# Patient Record
Sex: FEMALE | Race: WHITE | Employment: UNEMPLOYED | URBAN - METROPOLITAN AREA
[De-identification: names, ages, dates, MRNs, and addresses within clinical notes are randomized per-mention and may not be internally consistent; named-entity substitution may affect disease eponyms.]

---

## 2021-06-28 LAB
ANTIBODY SCREEN, EXTERNAL: NEGATIVE
HBSAG, EXTERNAL: NEGATIVE
HIV, EXTERNAL: NORMAL
RPR, EXTERNAL: NORMAL
RUBELLA, EXTERNAL: NORMAL
TYPE, ABO & RH, EXTERNAL: NORMAL

## 2021-07-20 LAB
CHLAMYDIA, EXTERNAL: POSITIVE
N. GONORRHEA, EXTERNAL: NEGATIVE

## 2021-08-30 LAB — CHLAMYDIA, EXTERNAL: NEGATIVE

## 2021-12-09 LAB — GRBS, EXTERNAL: NEGATIVE

## 2022-01-01 ENCOUNTER — HOSPITAL ENCOUNTER (INPATIENT)
Age: 22
LOS: 3 days | Discharge: HOME | End: 2022-01-04
Attending: OBSTETRICS & GYNECOLOGY | Admitting: OBSTETRICS & GYNECOLOGY
Payer: COMMERCIAL

## 2022-01-01 ENCOUNTER — ANESTHESIA EVENT (OUTPATIENT)
Dept: LABOR AND DELIVERY | Age: 22
End: 2022-01-01
Payer: COMMERCIAL

## 2022-01-01 ENCOUNTER — ANESTHESIA (OUTPATIENT)
Dept: LABOR AND DELIVERY | Age: 22
End: 2022-01-01
Payer: COMMERCIAL

## 2022-01-01 DIAGNOSIS — Z98.891 S/P CESAREAN SECTION: Primary | ICD-10-CM

## 2022-01-01 LAB
A1 MICROGLOB PLACENTAL VAG QL: POSITIVE
ABO + RH BLD: NORMAL
AMPHET UR QL SCN: NEGATIVE
BARBITURATES UR QL SCN: NEGATIVE
BASOPHILS # BLD: 0 K/UL (ref 0–0.1)
BASOPHILS NFR BLD: 0 % (ref 0–1)
BENZODIAZ UR QL: NEGATIVE
BLOOD GROUP ANTIBODIES SERPL: NORMAL
CANNABINOIDS UR QL SCN: NEGATIVE
COCAINE UR QL SCN: NEGATIVE
CONTROL LINE PRESENT?: ABNORMAL
DIFFERENTIAL METHOD BLD: ABNORMAL
DRUG SCRN COMMENT,DRGCM: NORMAL
EOSINOPHIL # BLD: 0.1 K/UL (ref 0–0.4)
EOSINOPHIL NFR BLD: 1 % (ref 0–7)
ERYTHROCYTE [DISTWIDTH] IN BLOOD BY AUTOMATED COUNT: 12.8 % (ref 11.5–14.5)
EXPIRATION DATE: ABNORMAL
HCT VFR BLD AUTO: 33.8 % (ref 35–47)
HGB BLD-MCNC: 11.1 G/DL (ref 11.5–16)
IMM GRANULOCYTES # BLD AUTO: 0.1 K/UL (ref 0–0.04)
IMM GRANULOCYTES NFR BLD AUTO: 1 % (ref 0–0.5)
INTERNAL NEGATIVE CONTROL: ABNORMAL
KIT LOT NO.: ABNORMAL
LYMPHOCYTES # BLD: 2 K/UL (ref 0.8–3.5)
LYMPHOCYTES NFR BLD: 15 % (ref 12–49)
MCH RBC QN AUTO: 27.7 PG (ref 26–34)
MCHC RBC AUTO-ENTMCNC: 32.8 G/DL (ref 30–36.5)
MCV RBC AUTO: 84.3 FL (ref 80–99)
METHADONE UR QL: NEGATIVE
MONOCYTES # BLD: 1 K/UL (ref 0–1)
MONOCYTES NFR BLD: 8 % (ref 5–13)
NEUTS SEG # BLD: 10.5 K/UL (ref 1.8–8)
NEUTS SEG NFR BLD: 75 % (ref 32–75)
NRBC # BLD: 0 K/UL (ref 0–0.01)
NRBC BLD-RTO: 0 PER 100 WBC
OPIATES UR QL: NEGATIVE
PCP UR QL: NEGATIVE
PLATELET # BLD AUTO: 255 K/UL (ref 150–400)
PMV BLD AUTO: 11.4 FL (ref 8.9–12.9)
RBC # BLD AUTO: 4.01 M/UL (ref 3.8–5.2)
SPECIMEN EXP DATE BLD: NORMAL
WBC # BLD AUTO: 13.8 K/UL (ref 3.6–11)

## 2022-01-01 PROCEDURE — 10H07YZ INSERTION OF OTHER DEVICE INTO PRODUCTS OF CONCEPTION, VIA NATURAL OR ARTIFICIAL OPENING: ICD-10-PCS | Performed by: OBSTETRICS & GYNECOLOGY

## 2022-01-01 PROCEDURE — 74011250636 HC RX REV CODE- 250/636: Performed by: OBSTETRICS & GYNECOLOGY

## 2022-01-01 PROCEDURE — 99284 EMERGENCY DEPT VISIT MOD MDM: CPT

## 2022-01-01 PROCEDURE — 76010000391 HC C SECN FIRST 1 HR: Performed by: OBSTETRICS & GYNECOLOGY

## 2022-01-01 PROCEDURE — 74011000250 HC RX REV CODE- 250: Performed by: ANESTHESIOLOGY

## 2022-01-01 PROCEDURE — 3E0R3BZ INTRODUCTION OF ANESTHETIC AGENT INTO SPINAL CANAL, PERCUTANEOUS APPROACH: ICD-10-PCS | Performed by: ANESTHESIOLOGY

## 2022-01-01 PROCEDURE — 74011000250 HC RX REV CODE- 250

## 2022-01-01 PROCEDURE — 74011000250 HC RX REV CODE- 250: Performed by: STUDENT IN AN ORGANIZED HEALTH CARE EDUCATION/TRAINING PROGRAM

## 2022-01-01 PROCEDURE — 76060000032 HC ANESTHESIA 0.5 TO 1 HR: Performed by: OBSTETRICS & GYNECOLOGY

## 2022-01-01 PROCEDURE — 75410000002 HC LABOR FEE PER 1 HR

## 2022-01-01 PROCEDURE — 75410000003 HC RECOV DEL/VAG/CSECN EA 0.5 HR

## 2022-01-01 PROCEDURE — 36415 COLL VENOUS BLD VENIPUNCTURE: CPT

## 2022-01-01 PROCEDURE — 00HU33Z INSERTION OF INFUSION DEVICE INTO SPINAL CANAL, PERCUTANEOUS APPROACH: ICD-10-PCS | Performed by: ANESTHESIOLOGY

## 2022-01-01 PROCEDURE — 10H073Z INSERTION OF MONITORING ELECTRODE INTO PRODUCTS OF CONCEPTION, VIA NATURAL OR ARTIFICIAL OPENING: ICD-10-PCS | Performed by: OBSTETRICS & GYNECOLOGY

## 2022-01-01 PROCEDURE — 77010026065 HC OXYGEN MINIMUM MEDICAL AIR

## 2022-01-01 PROCEDURE — 74011250636 HC RX REV CODE- 250/636: Performed by: STUDENT IN AN ORGANIZED HEALTH CARE EDUCATION/TRAINING PROGRAM

## 2022-01-01 PROCEDURE — 86900 BLOOD TYPING SEROLOGIC ABO: CPT

## 2022-01-01 PROCEDURE — 84112 EVAL AMNIOTIC FLUID PROTEIN: CPT | Performed by: OBSTETRICS & GYNECOLOGY

## 2022-01-01 PROCEDURE — 76815 OB US LIMITED FETUS(S): CPT

## 2022-01-01 PROCEDURE — 99282 EMERGENCY DEPT VISIT SF MDM: CPT

## 2022-01-01 PROCEDURE — 76060000078 HC EPIDURAL ANESTHESIA: Performed by: OBSTETRICS & GYNECOLOGY

## 2022-01-01 PROCEDURE — 65410000002 HC RM PRIVATE OB

## 2022-01-01 PROCEDURE — 77030014125 HC TY EPDRL BBMI -B: Performed by: ANESTHESIOLOGY

## 2022-01-01 PROCEDURE — 80307 DRUG TEST PRSMV CHEM ANLYZR: CPT

## 2022-01-01 PROCEDURE — 75410000000 HC DELIVERY VAGINAL/SINGLE

## 2022-01-01 PROCEDURE — 85025 COMPLETE CBC W/AUTO DIFF WBC: CPT

## 2022-01-01 PROCEDURE — 74011000250 HC RX REV CODE- 250: Performed by: OBSTETRICS & GYNECOLOGY

## 2022-01-01 RX ORDER — ONDANSETRON 2 MG/ML
4 INJECTION INTRAMUSCULAR; INTRAVENOUS
Status: DISCONTINUED | OUTPATIENT
Start: 2022-01-01 | End: 2022-01-04 | Stop reason: HOSPADM

## 2022-01-01 RX ORDER — ONDANSETRON 2 MG/ML
INJECTION INTRAMUSCULAR; INTRAVENOUS
Status: COMPLETED
Start: 2022-01-01 | End: 2022-01-01

## 2022-01-01 RX ORDER — OXYTOCIN/RINGER'S LACTATE 30/500 ML
PLASTIC BAG, INJECTION (ML) INTRAVENOUS
Status: DISPENSED
Start: 2022-01-01 | End: 2022-01-01

## 2022-01-01 RX ORDER — SODIUM CHLORIDE 0.9 % (FLUSH) 0.9 %
5-40 SYRINGE (ML) INJECTION EVERY 8 HOURS
Status: DISCONTINUED | OUTPATIENT
Start: 2022-01-01 | End: 2022-01-01 | Stop reason: HOSPADM

## 2022-01-01 RX ORDER — MORPHINE SULFATE 0.5 MG/ML
INJECTION, SOLUTION EPIDURAL; INTRATHECAL; INTRAVENOUS AS NEEDED
Status: DISCONTINUED | OUTPATIENT
Start: 2022-01-01 | End: 2022-01-01 | Stop reason: HOSPADM

## 2022-01-01 RX ORDER — EPHEDRINE SULFATE/0.9% NACL/PF 50 MG/5 ML
25 SYRINGE (ML) INTRAVENOUS ONCE
Status: COMPLETED | OUTPATIENT
Start: 2022-01-01 | End: 2022-01-01

## 2022-01-01 RX ORDER — SODIUM CHLORIDE, SODIUM LACTATE, POTASSIUM CHLORIDE, CALCIUM CHLORIDE 600; 310; 30; 20 MG/100ML; MG/100ML; MG/100ML; MG/100ML
125 INJECTION, SOLUTION INTRAVENOUS CONTINUOUS
Status: DISCONTINUED | OUTPATIENT
Start: 2022-01-01 | End: 2022-01-01

## 2022-01-01 RX ORDER — OXYTOCIN/RINGER'S LACTATE 30/500 ML
10 PLASTIC BAG, INJECTION (ML) INTRAVENOUS AS NEEDED
Status: COMPLETED | OUTPATIENT
Start: 2022-01-01 | End: 2022-01-01

## 2022-01-01 RX ORDER — OXYTOCIN/RINGER'S LACTATE 30/500 ML
1-25 PLASTIC BAG, INJECTION (ML) INTRAVENOUS
Status: DISCONTINUED | OUTPATIENT
Start: 2022-01-01 | End: 2022-01-02 | Stop reason: ALTCHOICE

## 2022-01-01 RX ORDER — OXYTOCIN/RINGER'S LACTATE 30/500 ML
PLASTIC BAG, INJECTION (ML) INTRAVENOUS
Status: DISCONTINUED
Start: 2022-01-01 | End: 2022-01-01

## 2022-01-01 RX ORDER — LIDOCAINE HYDROCHLORIDE AND EPINEPHRINE 20; 5 MG/ML; UG/ML
INJECTION, SOLUTION EPIDURAL; INFILTRATION; INTRACAUDAL; PERINEURAL AS NEEDED
Status: DISCONTINUED | OUTPATIENT
Start: 2022-01-01 | End: 2022-01-01 | Stop reason: HOSPADM

## 2022-01-01 RX ORDER — BUTORPHANOL TARTRATE 2 MG/ML
1 INJECTION INTRAMUSCULAR; INTRAVENOUS
Status: DISCONTINUED | OUTPATIENT
Start: 2022-01-01 | End: 2022-01-04 | Stop reason: HOSPADM

## 2022-01-01 RX ORDER — EPHEDRINE SULFATE/0.9% NACL/PF 50 MG/5 ML
12.5 SYRINGE (ML) INTRAVENOUS
Status: COMPLETED | OUTPATIENT
Start: 2022-01-01 | End: 2022-01-01

## 2022-01-01 RX ORDER — SODIUM CHLORIDE, SODIUM LACTATE, POTASSIUM CHLORIDE, CALCIUM CHLORIDE 600; 310; 30; 20 MG/100ML; MG/100ML; MG/100ML; MG/100ML
INJECTION, SOLUTION INTRAVENOUS
Status: DISCONTINUED | OUTPATIENT
Start: 2022-01-01 | End: 2022-01-01 | Stop reason: HOSPADM

## 2022-01-01 RX ORDER — DIPHENHYDRAMINE HCL 25 MG
25 CAPSULE ORAL
Status: DISCONTINUED | OUTPATIENT
Start: 2022-01-01 | End: 2022-01-04 | Stop reason: HOSPADM

## 2022-01-01 RX ORDER — WATER FOR INJECTION,STERILE
VIAL (ML) INJECTION
Status: DISPENSED
Start: 2022-01-01 | End: 2022-01-02

## 2022-01-01 RX ORDER — EPHEDRINE SULFATE/0.9% NACL/PF 50 MG/5 ML
SYRINGE (ML) INTRAVENOUS
Status: DISPENSED
Start: 2022-01-01 | End: 2022-01-02

## 2022-01-01 RX ORDER — OXYCODONE AND ACETAMINOPHEN 5; 325 MG/1; MG/1
2 TABLET ORAL
Status: DISCONTINUED | OUTPATIENT
Start: 2022-01-01 | End: 2022-01-04 | Stop reason: HOSPADM

## 2022-01-01 RX ORDER — SODIUM CHLORIDE 0.9 % (FLUSH) 0.9 %
5-40 SYRINGE (ML) INJECTION AS NEEDED
Status: DISCONTINUED | OUTPATIENT
Start: 2022-01-01 | End: 2022-01-04 | Stop reason: HOSPADM

## 2022-01-01 RX ORDER — ACETAMINOPHEN 325 MG/1
650 TABLET ORAL
Status: DISCONTINUED | OUTPATIENT
Start: 2022-01-01 | End: 2022-01-04 | Stop reason: HOSPADM

## 2022-01-01 RX ORDER — SIMETHICONE 80 MG
80 TABLET,CHEWABLE ORAL AS NEEDED
Status: DISCONTINUED | OUTPATIENT
Start: 2022-01-01 | End: 2022-01-04 | Stop reason: HOSPADM

## 2022-01-01 RX ORDER — FENTANYL/BUPIVACAINE/NS/PF 2-1250MCG
PREFILLED PUMP RESERVOIR EPIDURAL
Status: COMPLETED
Start: 2022-01-01 | End: 2022-01-01

## 2022-01-01 RX ORDER — SODIUM CHLORIDE 0.9 % (FLUSH) 0.9 %
5-40 SYRINGE (ML) INJECTION EVERY 8 HOURS
Status: DISCONTINUED | OUTPATIENT
Start: 2022-01-01 | End: 2022-01-04 | Stop reason: HOSPADM

## 2022-01-01 RX ORDER — PHENYLEPHRINE HYDROCHLORIDE 10 MG/ML
INJECTION INTRAVENOUS
Status: DISCONTINUED | OUTPATIENT
Start: 2022-01-01 | End: 2022-01-01 | Stop reason: HOSPADM

## 2022-01-01 RX ORDER — SODIUM CHLORIDE 0.9 % (FLUSH) 0.9 %
5-40 SYRINGE (ML) INJECTION AS NEEDED
Status: DISCONTINUED | OUTPATIENT
Start: 2022-01-01 | End: 2022-01-01 | Stop reason: HOSPADM

## 2022-01-01 RX ORDER — IBUPROFEN 400 MG/1
800 TABLET ORAL
Status: DISCONTINUED | OUTPATIENT
Start: 2022-01-01 | End: 2022-01-04 | Stop reason: HOSPADM

## 2022-01-01 RX ORDER — OXYTOCIN 10 [USP'U]/ML
INJECTION, SOLUTION INTRAMUSCULAR; INTRAVENOUS AS NEEDED
Status: DISCONTINUED | OUTPATIENT
Start: 2022-01-01 | End: 2022-01-01 | Stop reason: HOSPADM

## 2022-01-01 RX ORDER — BUTORPHANOL TARTRATE 2 MG/ML
INJECTION INTRAMUSCULAR; INTRAVENOUS
Status: DISPENSED
Start: 2022-01-01 | End: 2022-01-01

## 2022-01-01 RX ORDER — FENTANYL/BUPIVACAINE/NS/PF 2-1250MCG
12 PREFILLED PUMP RESERVOIR EPIDURAL CONTINUOUS
Status: DISCONTINUED | OUTPATIENT
Start: 2022-01-01 | End: 2022-01-01 | Stop reason: HOSPADM

## 2022-01-01 RX ORDER — MAGNESIUM CITRATE
SOLUTION, ORAL ORAL
Status: DISPENSED
Start: 2022-01-01 | End: 2022-01-02

## 2022-01-01 RX ORDER — OXYTOCIN/RINGER'S LACTATE 30/500 ML
87.3 PLASTIC BAG, INJECTION (ML) INTRAVENOUS AS NEEDED
Status: DISCONTINUED | OUTPATIENT
Start: 2022-01-01 | End: 2022-01-04 | Stop reason: HOSPADM

## 2022-01-01 RX ORDER — BUPIVACAINE HYDROCHLORIDE 2.5 MG/ML
INJECTION, SOLUTION EPIDURAL; INFILTRATION; INTRACAUDAL
Status: COMPLETED
Start: 2022-01-01 | End: 2022-01-01

## 2022-01-01 RX ORDER — BUPIVACAINE HYDROCHLORIDE 2.5 MG/ML
INJECTION, SOLUTION EPIDURAL; INFILTRATION; INTRACAUDAL
Status: COMPLETED | OUTPATIENT
Start: 2022-01-01 | End: 2022-01-01

## 2022-01-01 RX ORDER — ONDANSETRON 2 MG/ML
INJECTION INTRAMUSCULAR; INTRAVENOUS AS NEEDED
Status: DISCONTINUED | OUTPATIENT
Start: 2022-01-01 | End: 2022-01-01 | Stop reason: HOSPADM

## 2022-01-01 RX ORDER — NALOXONE HYDROCHLORIDE 0.4 MG/ML
0.4 INJECTION, SOLUTION INTRAMUSCULAR; INTRAVENOUS; SUBCUTANEOUS AS NEEDED
Status: DISCONTINUED | OUTPATIENT
Start: 2022-01-01 | End: 2022-01-04 | Stop reason: HOSPADM

## 2022-01-01 RX ORDER — CEFAZOLIN SODIUM 1 G/3ML
INJECTION, POWDER, FOR SOLUTION INTRAMUSCULAR; INTRAVENOUS
Status: DISPENSED
Start: 2022-01-01 | End: 2022-01-02

## 2022-01-01 RX ORDER — DOCUSATE SODIUM 100 MG/1
100 CAPSULE, LIQUID FILLED ORAL 2 TIMES DAILY
Status: DISCONTINUED | OUTPATIENT
Start: 2022-01-01 | End: 2022-01-04 | Stop reason: HOSPADM

## 2022-01-01 RX ORDER — FENTANYL CITRATE 50 UG/ML
INJECTION, SOLUTION INTRAMUSCULAR; INTRAVENOUS AS NEEDED
Status: DISCONTINUED | OUTPATIENT
Start: 2022-01-01 | End: 2022-01-01 | Stop reason: HOSPADM

## 2022-01-01 RX ORDER — ONDANSETRON 2 MG/ML
4 INJECTION INTRAMUSCULAR; INTRAVENOUS
Status: DISCONTINUED | OUTPATIENT
Start: 2022-01-01 | End: 2022-01-02 | Stop reason: ALTCHOICE

## 2022-01-01 RX ORDER — AZITHROMYCIN 100 MG/ML
INJECTION, POWDER, LYOPHILIZED, FOR SOLUTION INTRAVENOUS
Status: DISPENSED
Start: 2022-01-01 | End: 2022-01-02

## 2022-01-01 RX ORDER — SODIUM CHLORIDE, SODIUM LACTATE, POTASSIUM CHLORIDE, CALCIUM CHLORIDE 600; 310; 30; 20 MG/100ML; MG/100ML; MG/100ML; MG/100ML
125 INJECTION, SOLUTION INTRAVENOUS CONTINUOUS
Status: DISCONTINUED | OUTPATIENT
Start: 2022-01-01 | End: 2022-01-04 | Stop reason: HOSPADM

## 2022-01-01 RX ADMIN — SODIUM CHLORIDE, POTASSIUM CHLORIDE, SODIUM LACTATE AND CALCIUM CHLORIDE 125 ML/HR: 600; 310; 30; 20 INJECTION, SOLUTION INTRAVENOUS at 20:12

## 2022-01-01 RX ADMIN — SODIUM CHLORIDE, POTASSIUM CHLORIDE, SODIUM LACTATE AND CALCIUM CHLORIDE 125 ML/HR: 600; 310; 30; 20 INJECTION, SOLUTION INTRAVENOUS at 13:43

## 2022-01-01 RX ADMIN — BUPIVACAINE HYDROCHLORIDE 20 ML: 2.5 INJECTION, SOLUTION EPIDURAL; INFILTRATION; INTRACAUDAL; PERINEURAL at 06:20

## 2022-01-01 RX ADMIN — ONDANSETRON 4 MG: 2 INJECTION INTRAMUSCULAR; INTRAVENOUS at 05:41

## 2022-01-01 RX ADMIN — Medication 12 ML/HR: at 06:41

## 2022-01-01 RX ADMIN — OXYTOCIN 10000 MILLI-UNITS: 10 INJECTION INTRAVENOUS at 20:14

## 2022-01-01 RX ADMIN — OXYTOCIN 3 UNITS: 10 INJECTION, SOLUTION INTRAMUSCULAR; INTRAVENOUS at 19:33

## 2022-01-01 RX ADMIN — Medication 12 ML/HR: at 14:28

## 2022-01-01 RX ADMIN — BUTORPHANOL TARTRATE 1 MG: 2 INJECTION, SOLUTION INTRAMUSCULAR; INTRAVENOUS at 05:40

## 2022-01-01 RX ADMIN — SODIUM CHLORIDE, POTASSIUM CHLORIDE, SODIUM LACTATE AND CALCIUM CHLORIDE 125 ML/HR: 600; 310; 30; 20 INJECTION, SOLUTION INTRAVENOUS at 17:18

## 2022-01-01 RX ADMIN — SODIUM CHLORIDE, POTASSIUM CHLORIDE, SODIUM LACTATE AND CALCIUM CHLORIDE 999 ML/HR: 600; 310; 30; 20 INJECTION, SOLUTION INTRAVENOUS at 05:00

## 2022-01-01 RX ADMIN — SODIUM CHLORIDE, POTASSIUM CHLORIDE, SODIUM LACTATE AND CALCIUM CHLORIDE: 600; 310; 30; 20 INJECTION, SOLUTION INTRAVENOUS at 19:38

## 2022-01-01 RX ADMIN — PHENYLEPHRINE HYDROCHLORIDE 120 MCG: 10 INJECTION INTRAVENOUS at 19:38

## 2022-01-01 RX ADMIN — AZITHROMYCIN MONOHYDRATE 500 MG: 500 INJECTION, POWDER, LYOPHILIZED, FOR SOLUTION INTRAVENOUS at 19:25

## 2022-01-01 RX ADMIN — PHENYLEPHRINE HYDROCHLORIDE 25 MCG/KG/MIN: 10 INJECTION INTRAVENOUS at 19:21

## 2022-01-01 RX ADMIN — Medication 2.5 MG: at 19:34

## 2022-01-01 RX ADMIN — Medication 12.5 MG: at 12:18

## 2022-01-01 RX ADMIN — Medication 25 MG: at 14:24

## 2022-01-01 RX ADMIN — Medication 1 MILLI-UNITS/MIN: at 10:03

## 2022-01-01 RX ADMIN — FENTANYL CITRATE 100 MCG: 50 INJECTION, SOLUTION INTRAMUSCULAR; INTRAVENOUS at 19:28

## 2022-01-01 RX ADMIN — SODIUM CHLORIDE, POTASSIUM CHLORIDE, SODIUM LACTATE AND CALCIUM CHLORIDE 125 ML/HR: 600; 310; 30; 20 INJECTION, SOLUTION INTRAVENOUS at 06:20

## 2022-01-01 RX ADMIN — LIDOCAINE HYDROCHLORIDE,EPINEPHRINE BITARTRATE 4 ML: 20; .005 INJECTION, SOLUTION EPIDURAL; INFILTRATION; INTRACAUDAL; PERINEURAL at 19:24

## 2022-01-01 RX ADMIN — SODIUM CHLORIDE, POTASSIUM CHLORIDE, SODIUM LACTATE AND CALCIUM CHLORIDE: 600; 310; 30; 20 INJECTION, SOLUTION INTRAVENOUS at 19:20

## 2022-01-01 RX ADMIN — CEFAZOLIN SODIUM 2 G: 1 INJECTION, POWDER, FOR SOLUTION INTRAMUSCULAR; INTRAVENOUS at 18:59

## 2022-01-01 RX ADMIN — ONDANSETRON HYDROCHLORIDE 4 MG: 2 INJECTION, SOLUTION INTRAMUSCULAR; INTRAVENOUS at 19:29

## 2022-01-01 RX ADMIN — OXYTOCIN 2 UNITS: 10 INJECTION, SOLUTION INTRAMUSCULAR; INTRAVENOUS at 19:48

## 2022-01-01 RX ADMIN — LIDOCAINE HYDROCHLORIDE,EPINEPHRINE BITARTRATE 8 ML: 20; .005 INJECTION, SOLUTION EPIDURAL; INFILTRATION; INTRACAUDAL; PERINEURAL at 18:58

## 2022-01-01 NOTE — H&P
History & Physical    Name: Viry Bah MRN: [id number]  SSN: [id number]    YOB: 2000  Age: 22 y.o. Sex: female        Subjective:   CC: Contractions and ROM  Estimated Date of Delivery: 22  OB History    Para Term  AB Living   1             SAB IAB Ectopic Molar Multiple Live Births                    # Outcome Date GA Lbr Dionisio/2nd Weight Sex Delivery Anes PTL Lv   1 Current                Ms. Taty Chandler is admitted with pregnancy at 39w6d for active labor. Pt presents c/o regular contractions and ROM at 0245 hrs. Prenatal course was complicated by anxiety/Dep/Bipolar for which the pt takes 50 mg Seroquel qhs. Cx was closed at last office visit. Valerie Mitchell Please see prenatal records for details. GBS is negative. Past Medical History:   Diagnosis Date    Asthma     last attack is in few years. Exercise induced    Chlamydia     with this preg. and treated    Psychiatric problem     Bipolar - on meds     Past Surgical History:   Procedure Laterality Date    HX OTHER SURGICAL      Oral surgery     Social History     Occupational History    Not on file   Tobacco Use    Smoking status: Former Smoker     Packs/day: 0.50     Years: 2.00     Pack years: 1.00     Quit date: 2021     Years since quittin.0    Smokeless tobacco: Never Used   Substance and Sexual Activity    Alcohol use: Never    Drug use: Not Currently     Types: Marijuana    Sexual activity: Not on file     Family History   Problem Relation Age of Onset    Bleeding Prob Mother     Heart Disease Mother     Asthma Father     Psychiatric Disorder Sister     Cancer Maternal Grandmother     Diabetes Maternal Grandmother     Cancer Maternal Grandfather     Cancer Paternal Grandmother      Allergies   Allergen Reactions    Pcn [Penicillins] Other (comments)     Prior to Admission medications    Medication Sig Start Date End Date Taking?  Authorizing Provider   PNV Comb #2-Iron-FA-Omega 3 29-1-400 mg cmpk Take 1 Tablet by mouth. Indications: pregnancy   Yes Provider, Historical        Review of Systems   Constitutional: Negative for chills and fever. HENT: Negative for mouth sores and sore throat. Eyes: Negative for visual disturbance. Respiratory: Negative for cough, shortness of breath and wheezing. Cardiovascular: Negative for chest pain, palpitations and leg swelling. Gastrointestinal: Negative for constipation, diarrhea, nausea and vomiting. Endocrine: Negative for cold intolerance and heat intolerance. Genitourinary: Positive for vaginal discharge. Negative for dysuria, frequency, genital sores, hematuria and vaginal bleeding. Musculoskeletal: Negative for arthralgias and myalgias. Skin: Negative for rash. Neurological: Negative for dizziness and headaches. Hematological: Negative for adenopathy. Psychiatric/Behavioral: Negative for agitation, behavioral problems, confusion and decreased concentration. Objective:     Vitals:  Vitals:    01/01/22 0436 01/01/22 0439 01/01/22 0442 01/01/22 0444   BP:       Pulse:       Resp:       Temp:       SpO2: 93% 96% 93% 97%   Weight:       Height:            Physical Exam  Vitals and nursing note reviewed. Exam conducted with a chaperone present. Constitutional:       General: She is not in acute distress. Appearance: Normal appearance. She is obese. She is not ill-appearing, toxic-appearing or diaphoretic. HENT:      Head: Normocephalic and atraumatic. Right Ear: External ear normal.      Left Ear: External ear normal.   Eyes:      General: No scleral icterus. Right eye: No discharge. Left eye: No discharge. Extraocular Movements: Extraocular movements intact. Cardiovascular:      Rate and Rhythm: Normal rate and regular rhythm. Heart sounds: Normal heart sounds. No murmur heard. No friction rub. No gallop. Pulmonary:      Effort: Pulmonary effort is normal. No respiratory distress.       Breath sounds: Normal breath sounds. No stridor. No wheezing, rhonchi or rales. Abdominal:      General: Bowel sounds are normal. There is no distension. Palpations: Abdomen is soft. There is no mass. Tenderness: There is no abdominal tenderness. There is no right CVA tenderness, left CVA tenderness, guarding or rebound. Hernia: No hernia is present. Genitourinary:     General: Normal vulva. Musculoskeletal:         General: No swelling, tenderness, deformity or signs of injury. Normal range of motion. Cervical back: Normal range of motion and neck supple. No rigidity or tenderness. Right lower leg: No edema. Left lower leg: No edema. Lymphadenopathy:      Cervical: No cervical adenopathy. Skin:     General: Skin is warm and dry. Capillary Refill: Capillary refill takes less than 2 seconds. Coloration: Skin is not jaundiced or pale. Findings: No bruising, erythema, lesion or rash. Neurological:      Mental Status: She is alert and oriented to person, place, and time. Deep Tendon Reflexes: Reflexes normal.   Psychiatric:         Mood and Affect: Mood normal.         Behavior: Behavior normal.         Thought Content: Thought content normal.         Judgment: Judgment normal.         Cervical Exam: 2 cm dilated    70% effaced    -2 station    Presenting Part: Vtx by U/S  Uterine Activity: Frequency: Every 5 minutes   Membranes: Spontaneous Rupture of Membranes; Amniotic Fluid: thin meconium fluid  Fetal Heart Rate: Reactive     Prenatal Labs:   No results found for: ABORH, RUBELLAEXT, GRBSEXT, HBSAGEXT, HIVEXT, RPREXT, GONNOEXT, CHLAMEXT, ABORHEXT, RUBELLAEXT, GRBSEXT, HBSAGEXT, HIVEXT, RPREXT, GONNOEXT, CHLAMEXT       Impression/Plan:     1) IUP at 44 w 6d  2) Early labor with SROM lite mec     Plan: Admit for labor. Group B Strep was negative. Anticipate . CBC, T&S. Pt desires epidural.Will call Dr. Paula Calzada to notify of patient arrival..

## 2022-01-01 NOTE — ANESTHESIA PREPROCEDURE EVALUATION
Relevant Problems   No relevant active problems       Anesthetic History   No history of anesthetic complications            Review of Systems / Medical History  Patient summary reviewed, nursing notes reviewed and pertinent labs reviewed    Pulmonary            Asthma : well controlled       Neuro/Psych   Within defined limits           Cardiovascular  Within defined limits                Exercise tolerance: >4 METS     GI/Hepatic/Renal  Within defined limits              Endo/Other  Within defined limits           Other Findings              Physical Exam    Airway  Mallampati: II  TM Distance: 4 - 6 cm  Neck ROM: normal range of motion   Mouth opening: Normal     Cardiovascular  Regular rate and rhythm,  S1 and S2 normal,  no murmur, click, rub, or gallop  Rhythm: regular  Rate: normal         Dental  No notable dental hx       Pulmonary  Breath sounds clear to auscultation               Abdominal  GI exam deferred       Other Findings            Anesthetic Plan    ASA: 2, emergent  Anesthesia type: epidural            Anesthetic plan and risks discussed with: Patient

## 2022-01-01 NOTE — PROGRESS NOTES
Labor Progress Note  Patient seen, fetal heart rate and contraction pattern evaluated. Patient reports she remains comfortable. Physical Exam:  Visit Vitals  BP (!) 89/54   Pulse 85   Temp 98.2 °F (36.8 °C)   Resp 18   Ht 5' 3\" (1.6 m)   Wt 89 kg (196 lb 3.4 oz)   SpO2 96%   Breastfeeding Yes   BMI 34.76 kg/m²     Cervical Exam: 4/90/-3  Membranes:  ruptured  Uterine Activity: Frequency: 2-3 minues  Fetal Heart Rate: 150,  adequate variability and reactivity  Accelerations: yes  Decelerations: variable and occasional late  Bedside US confirms vertex presentation, appears LOP position  Pitocin at 3 miu/min    Assessment/Plan:  Little progress, station remains high  IUPC and FSE placed without difficulty  Titrate pitocin  Maintain maternal/fetal surveillance    LION Luna MD

## 2022-01-01 NOTE — ANESTHESIA PROCEDURE NOTES
Epidural Block    Patient location during procedure: OB  Start time: 1/1/2022 6:20 AM  End time: 1/1/2022 6:29 AM  Reason for block: labor epidural  Staffing  Performed: attending   Anesthesiologist: Salty Palm MD  Preanesthetic Checklist  Completed: patient identified, IV checked, site marked, risks and benefits discussed, surgical consent, monitors and equipment checked, pre-op evaluation and timeout performed  Block Placement  Patient position: sitting  Prep: DuraPrep  Sterility prep: cap, drape, gloves and mask  Sedation level: no sedation  Patient monitoring: heart rate, frequent blood pressure checks and continuous pulse oximetry  Approach: midline  Location: lumbar  Lumbar location: L2-L3  Epidural  Loss of resistance technique: saline  Guidance: landmark technique  Needle  Needle type: Tuohy   Needle gauge: 17 G  Needle length: 10 cm  Needle insertion depth: 5 cm  Catheter type: multi-orifice  Catheter size: 19 G  Catheter at skin depth: 10 cm  Catheter securement method: clear occlusive dressing and surgical tape  Test dose: negative  Medications Administered  Bupivacaine (PF) (MARCAINE) 0.25% Epidural, 20 mL  Assessment  Sensory level: T10  Block outcome: pain improved  Number of attempts: 1  Procedure assessment: patient tolerated procedure well with no immediate complications

## 2022-01-01 NOTE — PROGRESS NOTES
Labor Progress Note  Patient seen, fetal heart rate and contraction pattern evaluated. Patient reports she feels comfortable with epidural in place. Physical Exam:  Visit Vitals  /78   Pulse (!) 102   Temp 99.1 °F (37.3 °C)   Resp 18   Ht 5' 3\" (1.6 m)   Wt 89 kg (196 lb 3.4 oz)   SpO2 96%   Breastfeeding Yes   BMI 34.76 kg/m²     Cervical Exam: 3-4/80/-3  Membranes:  Ruptured, light meconium,  Uterine Activity: Frequency: 5 minutes  Fetal Heart Rate: 130,  adequate variability and reactivity  Accelerations: yes  Decelerations: none    Assessment/Plan:  Reassuring fetal status. Inadequate contraction pattern, will start augmentation.     Ramesh Montalvo MD

## 2022-01-01 NOTE — PROGRESS NOTES
0730: Bedside verbal report received from [address] [place] [address]: IUPC placed cx 4cm, Dr. Henry Morse at bedside    1332: FSE placed by Dr. Henry Morse    1400: Bedside US done by DR. Alaniz to check fetal position, confirmed vertex, possible LOP,     1450: Initiated amnio-infusion per verbal orders from Dr. Henry Morse, bolus 400ml/ 150ml hr continuous after bolus complete    1830: C-section called for fetal intolerance

## 2022-01-01 NOTE — PROGRESS NOTES
Arrived to labor and delivery triage with c/o contraction since 2200 and srom at 0245 with cl fluid noted. Intro. Done. poc explained. To br to change clothes and void. To bed. Efm explained and applied. Assess. Done. 0350. Dr. Yolis Coleman at bedside. View strip. poc explained. Assess. Done. sve done 2 cm. Ultrasound done. poc explained. L/o ro admit. Pt. Asmnisure is positive, LMSF noted on underpad. 0530. Got order from Dr. Yolis Coleman to give iv pain med with zofran. 0540. Poc explained. Iv pain med. Given with instr. siderails up x 2 and call light within reach.0620. Sitting up at bedside. Anes. At bedside. Lucho Morales. Cath. Placed and med. Given by anes. Ghassan. Well    [phone]. Repositioned sf. Efm adj.

## 2022-01-02 LAB
BASOPHILS # BLD: 0 K/UL (ref 0–0.1)
BASOPHILS NFR BLD: 0 % (ref 0–1)
DIFFERENTIAL METHOD BLD: ABNORMAL
EOSINOPHIL # BLD: 0.1 K/UL (ref 0–0.4)
EOSINOPHIL NFR BLD: 1 % (ref 0–7)
ERYTHROCYTE [DISTWIDTH] IN BLOOD BY AUTOMATED COUNT: 12.7 % (ref 11.5–14.5)
HCT VFR BLD AUTO: 23.7 % (ref 35–47)
HGB BLD-MCNC: 7.7 G/DL (ref 11.5–16)
IMM GRANULOCYTES # BLD AUTO: 0.1 K/UL (ref 0–0.04)
IMM GRANULOCYTES NFR BLD AUTO: 1 % (ref 0–0.5)
LYMPHOCYTES # BLD: 1.5 K/UL (ref 0.8–3.5)
LYMPHOCYTES NFR BLD: 15 % (ref 12–49)
MCH RBC QN AUTO: 28.1 PG (ref 26–34)
MCHC RBC AUTO-ENTMCNC: 32.5 G/DL (ref 30–36.5)
MCV RBC AUTO: 86.5 FL (ref 80–99)
MONOCYTES # BLD: 0.6 K/UL (ref 0–1)
MONOCYTES NFR BLD: 6 % (ref 5–13)
NEUTS SEG # BLD: 7.9 K/UL (ref 1.8–8)
NEUTS SEG NFR BLD: 78 % (ref 32–75)
NRBC # BLD: 0 K/UL (ref 0–0.01)
NRBC BLD-RTO: 0 PER 100 WBC
PLATELET # BLD AUTO: 156 K/UL (ref 150–400)
PMV BLD AUTO: 11.6 FL (ref 8.9–12.9)
RBC # BLD AUTO: 2.74 M/UL (ref 3.8–5.2)
WBC # BLD AUTO: 10.1 K/UL (ref 3.6–11)

## 2022-01-02 PROCEDURE — 74011250636 HC RX REV CODE- 250/636: Performed by: OBSTETRICS & GYNECOLOGY

## 2022-01-02 PROCEDURE — 85025 COMPLETE CBC W/AUTO DIFF WBC: CPT

## 2022-01-02 PROCEDURE — 74011250637 HC RX REV CODE- 250/637: Performed by: OBSTETRICS & GYNECOLOGY

## 2022-01-02 PROCEDURE — 36415 COLL VENOUS BLD VENIPUNCTURE: CPT

## 2022-01-02 PROCEDURE — 65410000002 HC RM PRIVATE OB

## 2022-01-02 RX ADMIN — SODIUM CHLORIDE, POTASSIUM CHLORIDE, SODIUM LACTATE AND CALCIUM CHLORIDE 125 ML/HR: 600; 310; 30; 20 INJECTION, SOLUTION INTRAVENOUS at 02:36

## 2022-01-02 RX ADMIN — IBUPROFEN 800 MG: 400 TABLET, FILM COATED ORAL at 19:06

## 2022-01-02 RX ADMIN — DOCUSATE SODIUM 100 MG: 100 CAPSULE, LIQUID FILLED ORAL at 19:53

## 2022-01-02 RX ADMIN — SODIUM CHLORIDE, POTASSIUM CHLORIDE, SODIUM LACTATE AND CALCIUM CHLORIDE 125 ML/HR: 600; 310; 30; 20 INJECTION, SOLUTION INTRAVENOUS at 02:37

## 2022-01-02 RX ADMIN — OXYCODONE AND ACETAMINOPHEN 2 TABLET: 5; 325 TABLET ORAL at 19:53

## 2022-01-02 RX ADMIN — SODIUM CHLORIDE, POTASSIUM CHLORIDE, SODIUM LACTATE AND CALCIUM CHLORIDE 125 ML/HR: 600; 310; 30; 20 INJECTION, SOLUTION INTRAVENOUS at 10:57

## 2022-01-02 RX ADMIN — DIPHENHYDRAMINE HYDROCHLORIDE 25 MG: 25 CAPSULE ORAL at 01:19

## 2022-01-02 RX ADMIN — DOCUSATE SODIUM 100 MG: 100 CAPSULE, LIQUID FILLED ORAL at 09:06

## 2022-01-02 RX ADMIN — IBUPROFEN 800 MG: 400 TABLET, FILM COATED ORAL at 00:57

## 2022-01-02 RX ADMIN — IBUPROFEN 800 MG: 400 TABLET, FILM COATED ORAL at 11:47

## 2022-01-02 NOTE — PROGRESS NOTES
Duramorph Follow-Up Note    1 Day Post-Op sp Procedure(s):   SECTION. BP (!) 105/52 (BP 1 Location: Right upper arm, BP Patient Position: At rest)   Pulse 88   Temp 36.9 °C (98.5 °F)   Resp 16   Ht 5' 3\" (1.6 m)   Wt 89 kg (196 lb 3.4 oz)   SpO2 96%   Breastfeeding Yes   BMI 34.76 kg/m² . Patient is POD-1 S/P intrathecal duramorph. Pain is well controlled  Patient reports no headache, fever, weakness or numbness. Epidural/spinal tap site is clean, dry and intact. No obvious Anesthesia complications noted. Plan:    Pain management as per primary service.

## 2022-01-02 NOTE — PROGRESS NOTES
Labor Progress Note  Patient seen, fetal heart rate and contraction pattern evaluated. Physical Exam:  Visit Vitals  BP (!) 104/55   Pulse 88   Temp (P) 98.9 °F (37.2 °C)   Resp 16   Ht 5' 3\" (1.6 m)   Wt 89 kg (196 lb 3.4 oz)   SpO2 98%   Breastfeeding Yes   BMI 34.76 kg/m²     Cervical Exam: 5/80/-2  Membranes:  ruptured  Uterine Activity: Frequency: 2-3 minutes  Fetal Heart Rate: 150,  adequate variability and reactivity  Accelerations: yes  Decelerations: variable/late with each contractions    Assessment/Plan:  Little change in exam despite clinically adequate contractions >4 hours; now also with persistent variable/late decelerations  Discussed options--continued conservative vs CS. Discussed R&B of each approach; recommend proceed to CS. Discussed procedure, including risks: maternal and fetal mortality, morbidity, including injury--fetal, maternal bowel, vascular, urinary tract; bleeding, infection. Questions answered. Patient indicates she understands and wishes to proceed.     Avelino Luther MD

## 2022-01-02 NOTE — PROGRESS NOTES
1910. Bedside shift change report given to LISA Poole, STEVEN (oncoming nurse) by LOLA Cash, STEVEN (offgoing nurse). Report included the following information SBAR, Kardex, Procedure Summary, Intake/Output, MAR and Recent Results. 1931. Primary LTCS of live BB by Dr. Claryce Goltz.     2000. Postpartum recovery started. 2010. Patient moved from [address] [place] [address] to room 3316.    0797. Bedside shift change report given to DINORA Sanchez (oncoming nurse) by Margot Poole, RN (offgoing nurse). Report included the following information SBAR, Kardex, Procedure Summary, Intake/Output, MAR and Recent Results.

## 2022-01-02 NOTE — OP NOTES
Operative Note    Name: Silvestre Jeffrey Record Number: [id number]   YOB: 2000  Today's Date: 2022      Pre-operative Diagnosis: 39 week IUP; active labor; arrest of descent first stage; meconium stained amniotic fluid; non reassuring fetal heart rate,    Post-operative Diagnosis: same with delivered infant    Operation: Primary Low Transverse  Section    Surgeon(s):  India Thompson MD    Assistant: Roberta Mazariegos RN    Anesthesia: epidural    EBL: 800ml    Prophylactic Antibiotics: Ancef; azithromycin    DVT Prophylaxis: Sequential Compression Devices         Fetal Description: carrasquillo; vertex; ROP, moderate meconium with stained placenta and membranes    Birth Information:   Information for the patient's :  Shelle Mortimer [[id number]]   Delivery of a 3.62 kg male infant on 2022 at 7:31 PM. Apgars were 7  and 8 . Umbilical Cord: 3 Vessels     Umbilical Cord Events: None     Placenta: Manual Removal removal with Intact; Other (comment) appearance. Amniotic Fluid Volume: Moderate     Amniotic Fluid Description:  Meconium    Specimens: none           Complications:  None    Implants: none    Procedure Detail:    After proper patient identification and consent, the patient was taken to the operating room, where epidural anesthesia was surgically dosed and found to be adequate. Reyes catheter had been placed during labor using sterile technique. The patient was prepped and draped in the normal sterile fashion. Surgical time-out was completed. The abdomen was entered using the Pfannenstiel technique. The peritoneum was entered bluntly well superior to the bladder without any apparent injury. Kota retractor was placed without difficulty. A low transverse uterine incision was made with the scalpel and extended with blunt finger dissection. Amniotomy was performed and the fluid was medium amount clear.  The baby  was then delivered atraumatically. The nose and mouth were suctioned. The cord was clamped and cut and the baby was handed off to [facility] in attendance. Placenta was then removed from the uterus. The uterus was curettaged with a moist lap pad and cleared of all clots and debris. The uterine incision was closed with 0 monocryl, double layer in running locked and imbricating fashion with adequate hemostasis subsequently noted. The anterior pelvis was irrigated with warm normal saline and adequate hemostasis was again noted  throughout. The Kota retractor was removed. The fascia was closed with 1 PDS Stratofix in a running fashion. Subcutaneous tissue was reapproximated with 3-0 vicryl. The skin was closed with a 3-0 monocryl subcuticular closure. The patient tolerated the procedure adequately. The robert continued to drain clear yellow urine. Sponge, lap, and needle counts were correct times three and the patient and baby were taken to recovery/postpartum room in stable condition.        MD Carol Patel MD  January 1, 2022  8:13 PM

## 2022-01-02 NOTE — PROGRESS NOTES
Bedside and Verbal shift change report given to LISA Brooks RN (oncoming nurse) by DINORA Sanchez (offgoing nurse). Report included the following information SBAR, Kardex, Procedure Summary, Intake/Output, MAR and Recent Results.

## 2022-01-02 NOTE — ANESTHESIA POSTPROCEDURE EVALUATION
Procedure(s):   SECTION.     epidural    Anesthesia Post Evaluation      Multimodal analgesia: multimodal analgesia used between 6 hours prior to anesthesia start to PACU discharge  Patient location during evaluation: bedside  Patient participation: complete - patient participated  Level of consciousness: awake and alert  Pain management: adequate  Airway patency: patent  Anesthetic complications: no  Cardiovascular status: acceptable, hemodynamically stable and blood pressure returned to baseline  Respiratory status: acceptable and room air  Hydration status: euvolemic  Post anesthesia nausea and vomiting:  none  Final Post Anesthesia Temperature Assessment:  Normothermia (36.0-37.5 degrees C)      INITIAL Post-op Vital signs:   Vitals Value Taken Time   /62 22   Temp 36.9 °C (98.5 °F) 22   Pulse 92 22   Resp 16 22   SpO2 99 % 22

## 2022-01-02 NOTE — PROGRESS NOTES
Pt ambulated to bathroom with standby assist of two staff members. With no c/o numbness/dizziness. Pt assisted with partial bath with dial soap and sara care. Epidural catheter removed. Pt assist back to bed with standby assist of two staff members with out any difficulty.

## 2022-01-02 NOTE — PROGRESS NOTES
Post-Operative  Day 1    [facility]  [address] . [facility] [address] for the patient's :  Axel Lamp [[id number]]   , Low Transverse    Patient doing well without significant complaint. Nausea and vomiting resolved, tolerating liquids, no flatus, robert in place. Vitals:    Visit Vitals  BP (!) 105/52 (BP 1 Location: Right upper arm, BP Patient Position: At rest)   Pulse 88   Temp 98.5 °F (36.9 °C)   Resp 16   Ht 5' 3\" (1.6 m)   Wt 89 kg (196 lb 3.4 oz)   SpO2 96%   Breastfeeding Yes   BMI 34.76 kg/m²     Temp (24hrs), Av.6 °F (37 °C), Min:98.1 °F (36.7 °C), Max:99.1 °F (37.3 °C)         Intake and Output:   Current shift: No intake/output data recorded. Last 3 completed shifts:  1901 -  0700  In: 3710.8 [I.V.:3710.8]  Out: 3163 [Urine:1750]        Exam:        Patient without distress. Lungs clear. Abdomen, bowel sounds present, soft, expected tenderness, fundus firm Wound dressing intact     Perineum normal lochia noted               Lower extremities are negative for swelling, cords or tenderness.     Labs:   Lab Results   Component Value Date/Time    WBC 10.1 2022 04:27 AM    WBC 13.8 (H) 2022 05:23 AM    HGB 7.7 (L) 2022 04:27 AM    HGB 11.1 (L) 2022 05:23 AM    HCT 23.7 (L) 2022 04:27 AM    HCT 33.8 (L) 2022 05:23 AM    PLATELET 308  04:27 AM    PLATELET 370 10/30/36 05:23 AM       Recent Results (from the past 24 hour(s))   CBC WITH AUTOMATED DIFF    Collection Time: 22  4:27 AM   Result Value Ref Range    WBC 10.1 3.6 - 11.0 K/uL    RBC 2.74 (L) 3.80 - 5.20 M/uL    HGB 7.7 (L) 11.5 - 16.0 g/dL    HCT 23.7 (L) 35.0 - 47.0 %    MCV 86.5 80.0 - 99.0 FL    MCH 28.1 26.0 - 34.0 PG    MCHC 32.5 30.0 - 36.5 g/dL    RDW 12.7 11.5 - 14.5 %    PLATELET 512 266 - 919 K/uL    MPV 11.6 8.9 - 12.9 FL    NRBC 0.0 0  WBC    ABSOLUTE NRBC 0.00 0.00 - 0.01 K/uL    NEUTROPHILS 78 (H) 32 - 75 %    LYMPHOCYTES 15 12 - 49 % MONOCYTES 6 5 - 13 %    EOSINOPHILS 1 0 - 7 %    BASOPHILS 0 0 - 1 %    IMMATURE GRANULOCYTES 1 (H) 0.0 - 0.5 %    ABS. NEUTROPHILS 7.9 1.8 - 8.0 K/UL    ABS. LYMPHOCYTES 1.5 0.8 - 3.5 K/UL    ABS. MONOCYTES 0.6 0.0 - 1.0 K/UL    ABS. EOSINOPHILS 0.1 0.0 - 0.4 K/UL    ABS. BASOPHILS 0.0 0.0 - 0.1 K/UL    ABS. IMM. GRANS. 0.1 (H) 0.00 - 0.04 K/UL    DF AUTOMATED         Assessment: Post-Op day 1, stable      Plan:   1.  Routine post-operative care

## 2022-01-03 PROBLEM — Z34.90 PREGNANCY: Status: ACTIVE | Noted: 2022-01-03

## 2022-01-03 PROCEDURE — 74011250637 HC RX REV CODE- 250/637: Performed by: OBSTETRICS & GYNECOLOGY

## 2022-01-03 PROCEDURE — 65410000002 HC RM PRIVATE OB

## 2022-01-03 RX ORDER — OXYCODONE AND ACETAMINOPHEN 5; 325 MG/1; MG/1
2 TABLET ORAL
Qty: 12 TABLET | Refills: 0 | Status: SHIPPED | OUTPATIENT
Start: 2022-01-03 | End: 2022-01-06

## 2022-01-03 RX ORDER — IBUPROFEN 800 MG/1
800 TABLET ORAL
Qty: 100 TABLET | Refills: 0 | Status: SHIPPED | OUTPATIENT
Start: 2022-01-03

## 2022-01-03 RX ORDER — DOCUSATE SODIUM 100 MG/1
100 CAPSULE, LIQUID FILLED ORAL 2 TIMES DAILY
Qty: 60 CAPSULE | Refills: 2 | Status: SHIPPED | OUTPATIENT
Start: 2022-01-03 | End: 2022-04-03

## 2022-01-03 RX ADMIN — OXYCODONE AND ACETAMINOPHEN 2 TABLET: 5; 325 TABLET ORAL at 15:37

## 2022-01-03 RX ADMIN — DOCUSATE SODIUM 100 MG: 100 CAPSULE, LIQUID FILLED ORAL at 09:27

## 2022-01-03 RX ADMIN — IBUPROFEN 800 MG: 400 TABLET, FILM COATED ORAL at 03:02

## 2022-01-03 RX ADMIN — OXYCODONE AND ACETAMINOPHEN 2 TABLET: 5; 325 TABLET ORAL at 03:02

## 2022-01-03 RX ADMIN — IBUPROFEN 800 MG: 400 TABLET, FILM COATED ORAL at 20:13

## 2022-01-03 RX ADMIN — OXYCODONE AND ACETAMINOPHEN 2 TABLET: 5; 325 TABLET ORAL at 09:27

## 2022-01-03 RX ADMIN — OXYCODONE AND ACETAMINOPHEN 2 TABLET: 5; 325 TABLET ORAL at 20:14

## 2022-01-03 RX ADMIN — IBUPROFEN 800 MG: 400 TABLET, FILM COATED ORAL at 11:26

## 2022-01-03 NOTE — PROGRESS NOTES
Post-Operative Rounding Note    Nadyne Canavan 22 y.o.      POD # 2 s/p PLTCS    Subjective:  Patient doing well. Has no complaints. Pain is well controlled. Patient is urinating without difficulty, ambulating, and passing flatus. Tolerating a regular diet. Lochia appropriate. No nausea, vomiting, fever, chills, CP, SOB, calf pain. Objective:  VS:   Visit Vitals  /69   Pulse 80   Temp 98.4 °F (36.9 °C)   Resp 16   Ht 5' 3\" (1.6 m)   Wt 89 kg (196 lb 3.4 oz)   SpO2 100%   Breastfeeding Yes   BMI 34.76 kg/m²       Gen: NAD  Abd: soft, appropriately TTP, no peritoneal signs, uterine fundus firm below the umbilicus, non-distended  Inc: Clean/Dry/Intact   Ext: warm, dry, nontender, with no edema noted, negative Homans sign   Labs: No results found for this or any previous visit (from the past 24 hour(s)).      Intake/Output Summary (Last 24 hours) at 1/3/2022 9385  Last data filed at 2022 1156  Gross per 24 hour   Intake    Output 500 ml   Net -500 ml       Assessment:  Nadyne Canavan 22 y.o.    POD # 2 s/p PLTCS   Afebrile, VSS, tolerating pain with medication, good oral intake, adequate urine output      Plan:  Patient desires discharge home   Follow up in one week for incision check     Evelio Lozano MD

## 2022-01-04 VITALS
HEIGHT: 63 IN | HEART RATE: 84 BPM | DIASTOLIC BLOOD PRESSURE: 69 MMHG | OXYGEN SATURATION: 97 % | BODY MASS INDEX: 34.77 KG/M2 | TEMPERATURE: 98.3 F | WEIGHT: 196.21 LBS | RESPIRATION RATE: 16 BRPM | SYSTOLIC BLOOD PRESSURE: 134 MMHG

## 2022-01-04 PROCEDURE — 74011250637 HC RX REV CODE- 250/637: Performed by: OBSTETRICS & GYNECOLOGY

## 2022-01-04 RX ORDER — HYDROCHLOROTHIAZIDE 25 MG/1
25 TABLET ORAL
Qty: 60 TABLET | Refills: 0 | Status: SHIPPED | OUTPATIENT
Start: 2022-01-04

## 2022-01-04 RX ADMIN — OXYCODONE AND ACETAMINOPHEN 2 TABLET: 5; 325 TABLET ORAL at 10:08

## 2022-01-04 RX ADMIN — OXYCODONE AND ACETAMINOPHEN 2 TABLET: 5; 325 TABLET ORAL at 04:25

## 2022-01-04 RX ADMIN — IBUPROFEN 800 MG: 400 TABLET, FILM COATED ORAL at 08:15

## 2022-01-04 RX ADMIN — DOCUSATE SODIUM 100 MG: 100 CAPSULE, LIQUID FILLED ORAL at 08:15

## 2022-01-04 NOTE — ROUTINE PROCESS
Patient discharged home, prescriptions sent & instructions given. Verbalized understanding. All questions answered. No distress noted. Signed copy of discharge instructions on paper chart. Pt to follow up with [facility] [place] in 4 weeks.

## 2022-01-04 NOTE — ADT AUTH CERT NOTES
[address]     FACILITY NPI :[id number]  FACILITY TAX ID :      [address]   [place] [address] [phone]          DEPT CONTACT:  Sage Velazquez  NB#[phone]  ALN#[phone]     Patient Name :Valrie Koyanagi   : 2000 (21 yrs)  MRN : [id number]     Patient Mailing Address [address]                                          [place] [47] , [address]                  Insurance Plan Payor: BLUE CROSS / Plan: [address] [place] [address] / Product Type: PPO /      Primary Coverage Subscriber ID : [id number]     Secondary Coverage:  N/A         Current Patient Class : INPATIENT  Admit Date : 2022     REQUESTED LEVEL OF CARE: INPATIENT [101]                                                           Diagnosis : Pregnancy                          ICD10 Code : Pregnancy [K00.75]  [O82]      Current Room and Bed 3316/01     Admitting and Attending Info:  Admitting Provider : Ángela Waters MD   NPI: [id number]  Admitting Provider Phone.  [phone]  Admitting Provider Address: [address]     Attending Provider Teo Blair MD   [id number]  Attending Provider Address:  Ximena Orozco                                                   [address] [place] [address]     Attending Provider Phone: Attending phys phone: [phone]            BABY INFORMATION :     Name :  Dee Kaufman    :   [birth year]22 (3 dys)      Delivery Type :      Weight in Grams :   3620 g      GA  :   44      Apgar 1 Min :   7  [7]      Apgar 5 Min :   8  [8]      Unit:   [facility] 3  NURSERY              MOM CHART--    Zheng Johnston     MRN: [id number]       Link to Baby  Comment        [de-identified] name MRN Account  Age Sex Admission Type Jm Olson [id number] 22 3 days M Confirmed Admission - L&D [facility]     OB History       1    Para   1    Term   1            AB        Living   1      SAB        IAB        Ectopic        Molar        Multiple   0    Live Births   1         # Outcome Date GA Labor/2nd Weight Sex Delivery Anes PTL Lv A1 A5   1 Term 22 39w6d  3.62 kg M CS-LTranv CSE N Living 7 8   Name: Glenda CHAVEZ   Complications: Fetal Intolerance, Failure to Progress in First Stage   Location: Other   Delivering Clinician: Sedrick Perdue MD        Prenatal History     Most Recent Value   Did You Receive Prenatal Care Yes   Name Of OB Provider Dr. Kamini Piper   Seen By M (Maternal Fetal Medicine)? No   Fetal Ultrasound Abnormalities/Concerns? No   Infant Feeding Breast Milk   Circumcision Planned Yes   Pediatrician After Birth/ Follow Up Baby Visits Undecided     Dating Summary    Working [facility]: 22 set by Tony Stapleton RN on 22 based on Other Basis     Based On [facility] [address] User Date   Other Basis 22 Working  Tony Stapleton RN 22     Prenatal Results      Prenatal Labs    Test Value Date Time   ABO/Rh * O Positive  21    Antibody Scrn * Negative  21    Hgb      Hct      Platelets      Rubella * Immune  21    RPR * Non-Reactive  21    T.  Pallidum Antibody      Urine      Hep B Surf Ag * Negative  21    Hep C      HIV * Non-Reactive  21    Gonorrhea * Negative  21    Chlamydia * Negative  21      * Positive  21    TSH      GTT, 1 HR (Glucola)      GTT, Fasting      GTT, 1 HR      GTT, 2 HR      GTT, 3 HR        3rd Trimester    Test Value Date Time   Hgb      Hct      Platelets      Group B Strep * Negative  21    Antibody Scrn      TSH      T. Pallidum Antibody      Hep B Surf Ag      Gonorrhea      Chlamydia         Screening/Diagnostics    Test Value Date Time   AFP Only      AFP Tetra      Hgb Electrophoresis      Amniocentesis      Cystic Fibrosis      Thalessemia      Brian-Sac      Canavan      PAP Smear      Beta HCG      NT      NIPT      COVID-19        Lab    Test Value Date Time   GTT, Fasting      GTT, 1HR      GTT, 2HR      GTT, 3HR      RPR * Non-Reactive  21    Beta HCG      CMV Ab      Toxoplasma Ab      Varicella Zoster Ab           Legend    *: Historical  View all results for this pregnancy       Emily Clancy [[id number]]       Delivery    Birth date/time: 2022 19:31:00  Delivery type: , Low Transverse   categorization: Primary   priority: Routine  Indications for : Arrest of Descent, Fetal Intolerance of Labor  Complications: Fetal Intolerance, Failure to Progress in First Stage    Labor Event Times    Decision date/time (emergent ): 2022 1830    Labor Events     labor?: No   steroids?: None  Rupture date/time: 2022 0245  Rupture type: SROM  Fluid color: Meconium  Fluid odor: None  Trial of labor?: Yes  Cervical ripening: None  Induction: None  Complications: Fetal Intolerance, Failure to Progress in First Stage    Delivery Providers    Delivering clinician: Karina Corey MD  Provider Role   Karina Corey MD Obstetrician   Vaishali Quinones Primary Nurse   Amber Luis RN Primary  Nurse   Olga Natarajan RN Charge Nurse   Hayder Hardin MD Anesthesiologist   [facility]   Reggie Chadwick Charge Nurse     Anesthesia    Method: CSE    Multiple Birth Onset Second Stage    No data filed    Operative Delivery    Forceps attempted?: No  Vacuum extractor attempted?: No    Presentation    Presentation: Vertex  Position: Left Occiput Posterior    Apgars    Living status: Living  Apgars:  1 min. :  5 min.:  10 min. :  15 min.:  20 min.:    Skin color:  0  0       Heart rate:  2  2       Reflex irritability:  2  2       Muscle tone:  2  2       Respiratory effort:  1  2 Total:  7  8       Apgars assigned by: DINORA DRISCOLL RN    Resuscitation    Method: Suctioning-bulb, Tactile Stimulation, Other (Comment)    Shoulder Dystocia    Shoulder dystocia present?: No    Measurements    Weight: 3620 g  Weight (lbs): 7 lb 15.7 oz  Length: 52.7 cm  Length (in): 20.75\"  Head circumference: 33.5 cm  Chest circumference: 35 cm  Abdominal girth: 33 cm    Lacerations    Episiotomy: None    Repair Needed: None  # of Repair Packets:   Suture Type and Size:   Suture Comment:   Estimated Blood Loss (mL):       Placenta    Placenta Date/Time: 1/1/2022 1933  Removal: Manual Removal  Appearance: Intact, Other (comment) Placenta disposition: Discarded   Placenta Appearance Comment: meconium stained    Vaginal Counts            Delivery Summary History    Event User Date/Time   Signed Chiquita Romero 1/1/2022 20:27:42

## 2022-01-04 NOTE — DISCHARGE INSTRUCTIONS
Patient Education        Postpartum: Care Instructions  Overview  After childbirth (postpartum period), your body goes through many changes. Some of these changes happen over several weeks. In the hours after delivery, your body will begin to recover from childbirth while it prepares to breastfeed your . You may feel emotional during this time. Your hormones can shift your mood without warning for no clear reason. In the first couple of weeks after childbirth, it's common to have emotions that change from happy to sad. You may find it hard to sleep. You may cry a lot. This is called the \"baby blues. \" These overwhelming emotions often go away within a couple of days or weeks. But it's important to discuss your feelings with your doctor. It's easy to get too tired and overwhelmed during the first weeks after childbirth. Don't try to do too much. Get rest whenever you can, accept help from others, and eat well and drink plenty of fluids. In the first couple of weeks after you give birth, your doctor or midwife may want to check in with you and make a plan for any follow-up care you may need. You will likely have a complete postpartum visit in the first 3 months after delivery. At that time, your doctor or midwife will check on your recovery from childbirth and see how you're doing with your emotions. You may also discuss your concerns or questions. Follow-up care is a key part of your treatment and safety. Be sure to make and go to all appointments, and call your doctor if you are having problems. It's also a good idea to know your test results and keep a list of the medicines you take. How can you care for yourself at home? · Sleep or rest when your baby sleeps. · Get help with household chores from family or friends, if you can. Don't try to do it all yourself. · If you have hemorrhoids or swelling or pain around the opening of your vagina, try using cold and heat.  You can put ice or a cold pack on the area for 10 to 20 minutes at a time. Put a thin cloth between the ice and your skin. Also try sitting in a few inches of warm water (sitz bath) 3 times a day and after bowel movements. · Take pain medicines exactly as directed. ? If the doctor gave you a prescription medicine for pain, take it as prescribed. ? If you are not taking a prescription pain medicine, ask your doctor if you can take an over-the-counter medicine. · Eat more fiber to avoid constipation. Include foods such as whole-grain breads and cereals, raw vegetables, raw and dried fruits, and beans. · Drink plenty of fluids. If you have kidney, heart, or liver disease and have to limit fluids, talk with your doctor before you increase the amount of fluids you drink. · Do not rinse inside your vagina with fluids (douche). · If you have stitches, keep the area clean by pouring or spraying warm water over the area outside your vagina and anus after you use the toilet. · Keep a list of questions to ask your doctor or midwife. Your questions might be about:  ? Changes in your breasts, such as lumps or soreness. ? When to expect your menstrual period to start again. ? What form of birth control is best for you. ? Weight you have put on during the pregnancy. ? Exercise options. ? What foods and drinks are best for you, especially if you are breastfeeding. ? Problems you might be having with breastfeeding. ? When you can have sex. You may want to talk about lubricants for your vagina. ? Any feelings of sadness or restlessness that you are having. When should you call for help? Call 911  anytime you think you may need emergency care. For example, call if:    · You have thoughts of harming yourself, your baby, or another person.     · You passed out (lost consciousness).     · You have chest pain, are short of breath, or cough up blood.     · You have a seizure.    Call your doctor now or seek immediate medical care if:    · Your vaginal bleeding seems to be getting heavier.     · You are dizzy or lightheaded, or you feel like you may faint.     · You have a fever.     · You have new or more belly pain.     · You have symptoms of a blood clot in your leg (called a deep vein thrombosis), such as:  ? Pain in the calf, back of the knee, thigh, or groin. ? Redness and swelling in your leg or groin.     · You have signs of preeclampsia, such as:  ? Sudden swelling of your face, hands, or feet. ? New vision problems (such as dimness, blurring, or seeing spots). ? A severe headache. Watch closely for changes in your health, and be sure to contact your doctor if:    · You have new or worse vaginal discharge.     · You feel sad or depressed.     · You are having problems with your breasts or breastfeeding. Where can you learn more? Go to http://www.gray.com/  Enter C468 in the search box to learn more about \"Postpartum: Care Instructions. \"  Current as of: June 16, 2021               Content Version: 13.0  © 7896-8471 [employer]. Care instructions adapted under license by [employer] (which disclaims liability or warranty for this information). If you have questions about a medical condition or this instruction, always ask your healthcare professional. [address] any warranty or liability for your use of this information.

## 2022-01-04 NOTE — PROGRESS NOTES
Post-Operative Day Number 3 Progress/Discharge Note    Patient doing well post-op day 3 from  delivery without significant complaints. Pain controlled on current medication. Voiding without difficulty, normal lochia. Vitals:  Patient Vitals in the past 8 hrs:   BP Temp Temp src Pulse Resp SpO2 Height Weight   04/18/10 0846 133/83 mmHg 97.9 °F (36.6 °C) - 74  20  - - -       Temp (24hrs), Av.6 °F (36.4 °C), Min:97.2 °F (36.2 °C), Max:97.9 °F (36.6 °C)      Vital signs stable, afebrile. Exam:  Patient without distress. Abdomen soft, fundus firm at level of umbilicus, nontender. Incision dry and                      clean without erythema. Lower extremities are negative for swelling, cords or tenderness. Labs: No results found for this or any previous visit (from the past 24 hour(s)). Assessment and Plan:  Patient appears to be having uncomplicated post- course. Continue routine post-op care and maternal education. Plan discharge for today with follow up in our office in 4 weeks.

## 2022-03-18 PROBLEM — Z34.90 PREGNANCY: Status: ACTIVE | Noted: 2022-01-03

## 2022-06-17 ENCOUNTER — OFFICE VISIT (OUTPATIENT)
Dept: URGENT CARE | Age: 22
End: 2022-06-17
Payer: COMMERCIAL

## 2022-06-17 VITALS
RESPIRATION RATE: 16 BRPM | TEMPERATURE: 99.2 F | DIASTOLIC BLOOD PRESSURE: 76 MMHG | WEIGHT: 170 LBS | OXYGEN SATURATION: 97 % | SYSTOLIC BLOOD PRESSURE: 110 MMHG | HEART RATE: 92 BPM | BODY MASS INDEX: 30.11 KG/M2

## 2022-06-17 DIAGNOSIS — J06.9 VIRAL UPPER RESPIRATORY ILLNESS: Primary | ICD-10-CM

## 2022-06-17 DIAGNOSIS — J02.9 SORE THROAT: ICD-10-CM

## 2022-06-17 LAB
S PYO AG THROAT QL: NEGATIVE
SARS-COV-2 PCR, POC: NEGATIVE
VALID INTERNAL CONTROL?: YES

## 2022-06-17 PROCEDURE — 87880 STREP A ASSAY W/OPTIC: CPT | Performed by: NURSE PRACTITIONER

## 2022-06-17 PROCEDURE — 87635 SARS-COV-2 COVID-19 AMP PRB: CPT | Performed by: NURSE PRACTITIONER

## 2022-06-17 PROCEDURE — 99203 OFFICE O/P NEW LOW 30 MIN: CPT | Performed by: NURSE PRACTITIONER

## 2022-06-17 NOTE — PROGRESS NOTES
Subjective: (As above and below)     The patient/guardian gave verbal consent to treat. Chief Complaint   Patient presents with    Cold Symptoms     Pt c/o sore throat, congestion and R side ear pain since Tuesday. Selvin Domingo is a 22 y.o. female who presents for evaluation of : sore throat, nasal congestion, right ear pain and right neck soreness. Symptom onset 3 days ago . Preceding illness: none. No other identified aggravating or alleviating factors. Symptoms are constant and overall not improving. Promotes no decrease in PO intake of fluids. Denies: severe lethargy, SOB, vomiting/diarrhea, chest pain, chest pain with breathing, severe headache,  fevers . Of note is currently breastfeeding. Known Exposure to COVID-19: no      ROS  Review of Systems - negative except as listed above    Reviewed PmHx, RxHx, FmHx, SocHx, AllgHx and updated in chart. Family History   Problem Relation Age of Onset    Bleeding Prob Mother     Heart Disease Mother     Asthma Father     Psychiatric Disorder Sister     Cancer Maternal Grandmother     Diabetes Maternal Grandmother     Cancer Maternal Grandfather     Cancer Paternal Grandmother        Past Medical History:   Diagnosis Date    Asthma     last attack is in few years. Exercise induced    Chlamydia     with this preg. and treated    Psychiatric problem     Bipolar - on meds      Social History     Socioeconomic History    Marital status: SINGLE   Tobacco Use    Smoking status: Former Smoker     Packs/day: 0.50     Years: 2.00     Pack years: 1.00     Quit date: 2021     Years since quittin.4    Smokeless tobacco: Never Used   Substance and Sexual Activity    Alcohol use: Never    Drug use: Not Currently     Types: Marijuana          Current Outpatient Medications   Medication Sig    sertraline HCl (ZOLOFT PO) Take  by mouth.  PNV Comb #2-Iron-FA-Omega 3 29-1-400 mg cmpk Take 1 Tablet by mouth.  Indications: pregnancy    hydroCHLOROthiazide (HYDRODIURIL) 25 mg tablet Take 1 Tablet by mouth two (2) times daily as needed (swelling).  ibuprofen (MOTRIN) 800 mg tablet Take 1 Tablet by mouth every eight (8) hours as needed for Pain. No current facility-administered medications for this visit. Objective:     Vitals:    06/17/22 1813   BP: 110/76   Pulse: 92   Resp: 16   Temp: 99.2 °F (37.3 °C)   SpO2: 97%   Weight: 170 lb (77.1 kg)       Physical Exam  General appearance - appears well hydrated and does not appear toxic, no acute distress  Eyes - EOMs intact. Non injected. No scleral icterus   Ears - no external swelling. TMs normal bilat. Nose - nasal congestion. No purulent drainage  Mouth - OP clear without swelling, exudate or lesion. Mucus membranes moist. Uvula midline. Neck/Lymphatics - trachea midline, full AROM, no LAD of neck  Chest - Normal breathing effort no wheeze rales, rhonchi or diminishments bilaterally. Heart - RRR, no murmurs  Skin - no observable rashes or pallor  Neurologic- alert and oriented x 3  Psychiatric- normal mood, behavior and though content. MSK- left SCM TTP. No LAD. Full AROM of neck. Assessment/ Plan:     1. Viral upper respiratory illness      2. Sore throat    - POCT COVID-19, SARS-COV-2, PCR  - AMB POC RAPID STREP A      Covid 19 test result today negative  Rapid strep test is negative. Will send throat culture and call for any abnormal results. Likely early viral illness  No evidence suggesting complication of illness at this time. Will discharge home with close monitoring and follow up.   Supportive home care for mild symptoms advised- maintain adequate fluid intake, over the counter Tylenol (for fever, aches, pains, chills), deep breathing exercises  Recommendation for self isolation/quarantine based on current CDC guidelines      Test Results:  Recent Results (from the past 6 hour(s))   AMB POC RAPID STREP A    Collection Time: 06/17/22  6:21 PM   Result Value Ref Range VALID INTERNAL CONTROL POC Yes     Group A Strep Ag Negative Negative   POCT COVID-19, SARS-COV-2, PCR    Collection Time: 06/17/22  6:23 PM   Result Value Ref Range    SARS-COV-2 PCR, POC Negative Negative       Follow up:  As needed for new, worsening or changes         Charisma Bhatti NP

## 2022-06-17 NOTE — PATIENT INSTRUCTIONS
covid 19 test still pending results; check mychart within next 1 hour; will call you for any positives        Results for orders placed or performed in visit on 06/17/22   AMB POC RAPID STREP A   Result Value Ref Range    VALID INTERNAL CONTROL POC Yes     Group A Strep Ag Negative Negative            Viral Respiratory Infection: Care Instructions  Your Care Instructions     Viruses are very small organisms. They grow in number after they enter your body. There are many types that cause different illnesses, such as colds and the mumps. The symptoms of a viral respiratory infection often start quickly. They include a fever, sore throat, and runny nose. You may also just not feel well. Or you may not want to eat much. Most viral respiratory infections are not serious. They usually get better with time and self-care. Antibiotics are not used to treat a viral infection. That's because antibiotics will not help cure a viral illness. In some cases, antiviral medicine can help your body fight a serious viral infection. Follow-up care is a key part of your treatment and safety. Be sure to make and go to all appointments, and call your doctor if you are having problems. It's also a good idea to know your test results and keep a list of the medicines you take. How can you care for yourself at home? · Rest as much as possible until you feel better. · Be safe with medicines. Take your medicine exactly as prescribed. Call your doctor if you think you are having a problem with your medicine. You will get more details on the specific medicine your doctor prescribes. · Take an over-the-counter pain medicine, such as acetaminophen (Tylenol), ibuprofen (Advil, Motrin), or naproxen (Aleve), as needed for pain and fever. Read and follow all instructions on the label. Do not give aspirin to anyone younger than 20. It has been linked to Reye syndrome, a serious illness. · Drink plenty of fluids.  Hot fluids, such as tea or soup, may help relieve congestion in your nose and throat. If you have kidney, heart, or liver disease and have to limit fluids, talk with your doctor before you increase the amount of fluids you drink. · Try to clear mucus from your lungs by breathing deeply and coughing. · Gargle with warm salt water once an hour. This can help reduce swelling and throat pain. Use 1 teaspoon of salt mixed in 1 cup of warm water. · Do not smoke or allow others to smoke around you. If you need help quitting, talk to your doctor about stop-smoking programs and medicines. These can increase your chances of quitting for good. To avoid spreading the virus  · Cough or sneeze into a tissue. Then throw the tissue away. · If you don't have a tissue, use your hand to cover your cough or sneeze. Then clean your hand. You can also cough into your sleeve. · Wash your hands often. Use soap and warm water. Wash for 15 to 20 seconds each time. · If you don't have soap and water near you, you can clean your hands with alcohol wipes or gel. When should you call for help? Call your doctor now or seek immediate medical care if:    · You have a new or higher fever.     · Your fever lasts more than 48 hours.     · You have trouble breathing.     · You have a fever with a stiff neck or a severe headache.     · You are sensitive to light.     · You feel very sleepy or confused. Watch closely for changes in your health, and be sure to contact your doctor if:    · You do not get better as expected. Where can you learn more? Go to http://www.gray.com/  Enter Q795 in the search box to learn more about \"Viral Respiratory Infection: Care Instructions. \"  Current as of: July 6, 2021               Content Version: 13.2  © 9849-3750 [employer] Care instructions adapted under license by [employer] (which disclaims liability or warranty for this information).  If you have questions about a medical condition or this instruction, always ask your healthcare professional. [place] [address] any warranty or liability for your use of this information.

## 2024-07-11 ENCOUNTER — OFFICE VISIT (OUTPATIENT)
Age: 24
End: 2024-07-11

## 2024-07-11 VITALS
BODY MASS INDEX: 19.84 KG/M2 | WEIGHT: 112 LBS | OXYGEN SATURATION: 100 % | HEART RATE: 52 BPM | RESPIRATION RATE: 18 BRPM | SYSTOLIC BLOOD PRESSURE: 115 MMHG | DIASTOLIC BLOOD PRESSURE: 71 MMHG | TEMPERATURE: 98.2 F

## 2024-07-11 DIAGNOSIS — R00.2 PALPITATIONS: ICD-10-CM

## 2024-07-11 DIAGNOSIS — R55 SYNCOPE, UNSPECIFIED SYNCOPE TYPE: Primary | ICD-10-CM

## 2024-07-11 DIAGNOSIS — R42 DIZZINESS: ICD-10-CM

## 2024-07-11 NOTE — PATIENT INSTRUCTIONS
Patient was seen today for a syncopal event which occurred last night  She continues to have dizziness, lightheadedness, vision loss, and full sensation in her ears  Additionally, she reports significant recent weight loss with occasional night sweats  She does not have a primary care provider and has not had any medical care in several years  EKG shows sinus bradycardia with marked sinus arrhythmia, rate of 56.  No ST segment or T wave changes  I would recommend that she go immediately to the emergency department for further evaluation at this time  I think that she needs imaging and blood work to rule out more serious causes of her current symptoms

## 2024-07-11 NOTE — PROGRESS NOTES
and depression and is in the process of trying to find a psychiatrist to see her for this.  She does report some occasional night sweats.  Denies fevers, chills, body aches or fatigue.  No recent illness or injury.  Denies any other recreational drug use, minimal alcohol use.  Denies chest pain or shortness of breath, no palpitations         Vitals:    07/11/24 1525   BP: 115/71   Site: Right Upper Arm   Pulse: 52   Resp: 18   Temp: 98.2 °F (36.8 °C)   SpO2: 100%   Weight: 50.8 kg (112 lb)       EKG shows sinus bradycardia with marked sinus arrhythmia.  Rate of 56.  Normal axis.  No ST segment or T wave abnormalities noted     Objective   Physical Exam  Vitals and nursing note reviewed.   Constitutional:       General: She is not in acute distress.     Appearance: Normal appearance. She is ill-appearing.   HENT:      Head: Normocephalic and atraumatic.   Cardiovascular:      Rate and Rhythm: Regular rhythm. Bradycardia present.      Pulses: Normal pulses.      Heart sounds: Normal heart sounds. No murmur heard.     No friction rub. No gallop.   Pulmonary:      Effort: Pulmonary effort is normal. No respiratory distress.      Breath sounds: Normal breath sounds. No wheezing, rhonchi or rales.   Skin:     General: Skin is warm and dry.   Neurological:      General: No focal deficit present.      Mental Status: She is alert and oriented to person, place, and time.      GCS: GCS eye subscore is 4. GCS verbal subscore is 5. GCS motor subscore is 6.      Motor: Motor function is intact. No weakness, tremor or seizure activity.      Gait: Gait is intact.               An electronic signature was used to authenticate this note.    JOSE G Hemphill NP

## 2025-02-20 ENCOUNTER — OFFICE VISIT (OUTPATIENT)
Age: 25
End: 2025-02-20

## 2025-02-20 VITALS
SYSTOLIC BLOOD PRESSURE: 110 MMHG | WEIGHT: 114 LBS | DIASTOLIC BLOOD PRESSURE: 76 MMHG | BODY MASS INDEX: 20.19 KG/M2 | TEMPERATURE: 98.7 F | HEART RATE: 63 BPM | RESPIRATION RATE: 16 BRPM | OXYGEN SATURATION: 99 %

## 2025-02-20 DIAGNOSIS — N64.52 NIPPLE DISCHARGE: ICD-10-CM

## 2025-02-20 DIAGNOSIS — R10.9 ABDOMINAL PAIN, UNSPECIFIED ABDOMINAL LOCATION: Primary | ICD-10-CM

## 2025-02-20 DIAGNOSIS — M79.10 MUSCLE PAIN: ICD-10-CM

## 2025-02-20 RX ORDER — IBUPROFEN 800 MG/1
800 TABLET, FILM COATED ORAL 2 TIMES DAILY PRN
Qty: 60 TABLET | Refills: 0 | Status: SHIPPED | OUTPATIENT
Start: 2025-02-20

## 2025-02-20 RX ORDER — CYCLOBENZAPRINE HCL 10 MG
10 TABLET ORAL 3 TIMES DAILY PRN
Qty: 30 TABLET | Refills: 0 | Status: SHIPPED | OUTPATIENT
Start: 2025-02-20 | End: 2025-03-02

## 2025-02-20 ASSESSMENT — ENCOUNTER SYMPTOMS: ABDOMINAL PAIN: 1

## 2025-02-20 NOTE — PATIENT INSTRUCTIONS
Discussed with patient should follow-up with GYN around continued lactation and right breast referral placed.  Should also follow-up with GI doctor around the chronic diarrhea referral placed.  Discussed with patient most likely muscle pain related to heavy lifting try to rest for the next few days and can take muscle relaxant as well as ibuprofen with food to see if pain resolves if any worsening pain go immediately to the emergency department for further evaluation otherwise if pain improves with rest and medication try to be more cognizant of body mechanics at work but continue to follow-up with GI has well muscle relaxant can make drowsy do not operate heavy machinery like driving while taking or drink any alcohol

## 2025-02-20 NOTE — PROGRESS NOTES
Tiana Moss (:  2000) is a 24 y.o. female,Established patient, here for evaluation of the following chief complaint(s):  Abdominal Pain (Stomach pain/ soreness started today tender to touch to lower right of belly button )          ASSESSMENT/PLAN:    Assessment & Plan  Abdominal pain, unspecified abdominal location       Orders:    [facility] Jono Coffey MD, Pediatric Gastroenterology, [place] ([address])    Muscle pain             Discussed with patient should follow-up with GYN around continued lactation and right breast referral placed.  Should also follow-up with GI doctor around the chronic diarrhea referral placed.  Discussed with patient most likely muscle pain related to heavy lifting try to rest for the next few days and can take muscle relaxant as well as ibuprofen with food to see if pain resolves if any worsening pain go immediately to the emergency department for further evaluation otherwise if pain improves with rest and medication try to be more cognizant of body mechanics at work but continue to follow-up with GI has well muscle relaxant can make drowsy do not operate heavy machinery like driving while taking or drink any alcohol    I have discussed the results, diagnosis and treatment plan with the patient. The patient also understands that early in the process of an illness, an urgent care workup can be falsely reassuring. Routine discharge counseling and specific return precautions discussed with patient and the patient understands that worsening, changing or persistent symptoms should prompt an immediate return to the urgent care or emergency department. Patient/Guardian expressed understanding and agrees with the discharge plan. No further questions at time of discharge.     SUBJECTIVE/OBJECTIVE:  24 y.o. female presents with symptoms of Abdominal Pain (Stomach pain/ soreness started today tender to touch to lower right of belly button )      24-year-old presents to urgent

## 2025-03-04 ENCOUNTER — TELEPHONE (OUTPATIENT)
Facility: CLINIC | Age: 25
End: 2025-03-04

## 2025-03-04 NOTE — TELEPHONE ENCOUNTER
----- Message from Samir TRINO sent at 3/4/2025 10:26 AM EST -----  Regarding: ECC Message to Provider  ECC Message to Provider    Relationship to Patient: Self     Additional Information Patient wants her appointment details on April 8th at 1:00 PM to be sent thru her email.  --------------------------------------------------------------------------------------------------------------------------    Call Back Information: OK to leave message on voicemail  Preferred Call Back Number: Phone [phone]   OB

## 2025-04-08 ENCOUNTER — OFFICE VISIT (OUTPATIENT)
Facility: CLINIC | Age: 25
End: 2025-04-08
Payer: COMMERCIAL

## 2025-04-08 VITALS
WEIGHT: 114.2 LBS | RESPIRATION RATE: 16 BRPM | HEIGHT: 63 IN | SYSTOLIC BLOOD PRESSURE: 103 MMHG | DIASTOLIC BLOOD PRESSURE: 67 MMHG | HEART RATE: 78 BPM | BODY MASS INDEX: 20.23 KG/M2 | TEMPERATURE: 98.2 F | OXYGEN SATURATION: 98 %

## 2025-04-08 DIAGNOSIS — R55 SYNCOPE, UNSPECIFIED SYNCOPE TYPE: ICD-10-CM

## 2025-04-08 DIAGNOSIS — R55 RECURRENT SYNCOPE: Primary | ICD-10-CM

## 2025-04-08 DIAGNOSIS — F32.1 CURRENT MODERATE EPISODE OF MAJOR DEPRESSIVE DISORDER, UNSPECIFIED WHETHER RECURRENT (HCC): ICD-10-CM

## 2025-04-08 DIAGNOSIS — F41.9 ANXIETY DISORDER, UNSPECIFIED TYPE: ICD-10-CM

## 2025-04-08 PROCEDURE — 90471 IMMUNIZATION ADMIN: CPT | Performed by: INTERNAL MEDICINE

## 2025-04-08 PROCEDURE — 90715 TDAP VACCINE 7 YRS/> IM: CPT | Performed by: INTERNAL MEDICINE

## 2025-04-08 PROCEDURE — 99204 OFFICE O/P NEW MOD 45 MIN: CPT | Performed by: INTERNAL MEDICINE

## 2025-04-08 SDOH — ECONOMIC STABILITY: FOOD INSECURITY: WITHIN THE PAST 12 MONTHS, THE FOOD YOU BOUGHT JUST DIDN'T LAST AND YOU DIDN'T HAVE MONEY TO GET MORE.: NEVER TRUE

## 2025-04-08 SDOH — HEALTH STABILITY: PHYSICAL HEALTH: ON AVERAGE, HOW MANY MINUTES DO YOU ENGAGE IN EXERCISE AT THIS LEVEL?: 20 MIN

## 2025-04-08 SDOH — ECONOMIC STABILITY: FOOD INSECURITY: WITHIN THE PAST 12 MONTHS, YOU WORRIED THAT YOUR FOOD WOULD RUN OUT BEFORE YOU GOT MONEY TO BUY MORE.: NEVER TRUE

## 2025-04-08 SDOH — HEALTH STABILITY: PHYSICAL HEALTH: ON AVERAGE, HOW MANY DAYS PER WEEK DO YOU ENGAGE IN MODERATE TO STRENUOUS EXERCISE (LIKE A BRISK WALK)?: 1 DAY

## 2025-04-08 ASSESSMENT — ENCOUNTER SYMPTOMS
SHORTNESS OF BREATH: 0
ABDOMINAL PAIN: 0
COUGH: 0
SINUS PAIN: 0
DIARRHEA: 0
NAUSEA: 0
TROUBLE SWALLOWING: 0
CONSTIPATION: 0
BACK PAIN: 0
CHEST TIGHTNESS: 0
BLOOD IN STOOL: 0

## 2025-04-08 ASSESSMENT — PATIENT HEALTH QUESTIONNAIRE - PHQ9
SUM OF ALL RESPONSES TO PHQ QUESTIONS 1-9: 0
SUM OF ALL RESPONSES TO PHQ QUESTIONS 1-9: 0
2. FEELING DOWN, DEPRESSED OR HOPELESS: NOT AT ALL
SUM OF ALL RESPONSES TO PHQ QUESTIONS 1-9: 0
1. LITTLE INTEREST OR PLEASURE IN DOING THINGS: NOT AT ALL
SUM OF ALL RESPONSES TO PHQ QUESTIONS 1-9: 0

## (undated) DEVICE — SOLUTION IV 1000ML 0.9% SOD CHL

## (undated) DEVICE — REM POLYHESIVE ADULT PATIENT RETURN ELECTRODE: Brand: VALLEYLAB

## (undated) DEVICE — TIP CLEANER: Brand: VALLEYLAB

## (undated) DEVICE — STERILE POLYISOPRENE POWDER-FREE SURGICAL GLOVES: Brand: PROTEXIS

## (undated) DEVICE — MEDI-VAC NON-CONDUCTIVE SUCTION TUBING: Brand: CARDINAL HEALTH

## (undated) DEVICE — SUTURE MCRYL SZ 0 L36IN ABSRB UD L36MM CT-1 1/2 CIR Y946H

## (undated) DEVICE — STAPLER SKIN SQ 30 ABSRB STPL DISP INSORB

## (undated) DEVICE — PREP SKN CHLRAPRP APL 26ML STR --

## (undated) DEVICE — SUTURE PDS II SZ 0 L36IN ABSRB VLT L40MM CT 1/2 CIR Z358T

## (undated) DEVICE — STRAP,POSITIONING,KNEE/BODY,FOAM,4X60": Brand: MEDLINE

## (undated) DEVICE — SOLIDIFIER FLD 3.2OZ 3000CC TRAD IN BTL LIQUI-LOC

## (undated) DEVICE — C-SECTION II-LF: Brand: MEDLINE INDUSTRIES, INC.

## (undated) DEVICE — 3000CC GUARDIAN II: Brand: GUARDIAN

## (undated) DEVICE — PENCIL ES L3M BTTN SWCH S STL HEX LOK BLDE ELECTRD HOLSTER

## (undated) DEVICE — SPONGE COUNTING BAG: Brand: DEVON